# Patient Record
Sex: FEMALE | Race: WHITE | Employment: PART TIME
[De-identification: names, ages, dates, MRNs, and addresses within clinical notes are randomized per-mention and may not be internally consistent; named-entity substitution may affect disease eponyms.]

---

## 2017-12-24 ENCOUNTER — HEALTH MAINTENANCE LETTER (OUTPATIENT)
Age: 32
End: 2017-12-24

## 2018-02-23 ENCOUNTER — OFFICE VISIT (OUTPATIENT)
Dept: DERMATOLOGY | Facility: CLINIC | Age: 33
End: 2018-02-23
Payer: COMMERCIAL

## 2018-02-23 DIAGNOSIS — L65.9 ALOPECIA: Primary | ICD-10-CM

## 2018-02-23 DIAGNOSIS — L65.9 ALOPECIA: ICD-10-CM

## 2018-02-23 LAB
BASOPHILS # BLD AUTO: 0.1 10E9/L (ref 0–0.2)
BASOPHILS NFR BLD AUTO: 1.2 %
DEPRECATED CALCIDIOL+CALCIFEROL SERPL-MC: 14 UG/L (ref 20–75)
DIFFERENTIAL METHOD BLD: NORMAL
EOSINOPHIL # BLD AUTO: 0.6 10E9/L (ref 0–0.7)
EOSINOPHIL NFR BLD AUTO: 8 %
ERYTHROCYTE [DISTWIDTH] IN BLOOD BY AUTOMATED COUNT: 12.1 % (ref 10–15)
HCT VFR BLD AUTO: 36.4 % (ref 35–47)
HGB BLD-MCNC: 11.9 G/DL (ref 11.7–15.7)
IMM GRANULOCYTES # BLD: 0 10E9/L (ref 0–0.4)
IMM GRANULOCYTES NFR BLD: 0.4 %
LYMPHOCYTES # BLD AUTO: 2 10E9/L (ref 0.8–5.3)
LYMPHOCYTES NFR BLD AUTO: 25.9 %
MCH RBC QN AUTO: 29.6 PG (ref 26.5–33)
MCHC RBC AUTO-ENTMCNC: 32.7 G/DL (ref 31.5–36.5)
MCV RBC AUTO: 91 FL (ref 78–100)
MONOCYTES # BLD AUTO: 0.5 10E9/L (ref 0–1.3)
MONOCYTES NFR BLD AUTO: 6.5 %
NEUTROPHILS # BLD AUTO: 4.5 10E9/L (ref 1.6–8.3)
NEUTROPHILS NFR BLD AUTO: 58 %
NRBC # BLD AUTO: 0 10*3/UL
NRBC BLD AUTO-RTO: 0 /100
PLATELET # BLD AUTO: 382 10E9/L (ref 150–450)
RBC # BLD AUTO: 4.02 10E12/L (ref 3.8–5.2)
T4 FREE SERPL-MCNC: 1.02 NG/DL (ref 0.76–1.46)
TSH SERPL DL<=0.005 MIU/L-ACNC: 4.99 MU/L (ref 0.4–4)
WBC # BLD AUTO: 7.8 10E9/L (ref 4–11)

## 2018-02-23 RX ORDER — CETIRIZINE HYDROCHLORIDE 10 MG/1
10 TABLET ORAL DAILY
COMMUNITY

## 2018-02-23 ASSESSMENT — PAIN SCALES - GENERAL: PAINLEVEL: NO PAIN (0)

## 2018-02-23 NOTE — LETTER
2/23/2018       RE: Barbie Varela  [address]  [place] [address]     Dear Colleague,    Thank you for referring your patient, Barbie Varela, to the [facility] DERMATOLOGY at [facility]. Please see a copy of my visit note below.         [facility] Dermatology Note      Dermatology Problem List:  1.  Cystic Acne, treated with Spironolactone  2.  Alopecia, labs obtained 2/23/18    Encounter Date: Feb 23, 2018    CC:  Chief Complaint   Patient presents with     Hair Loss     Barbie is here for hairloss. She has 2 spots of concern.      History of Present Illness:  Ms. Barbie Varela is a 32 year old female who presents for evaluation of hairloss.  She has been experiencing hair loss consistently for the past 2 years, with a recent exacerbation of hair loss over the past 6 months.  She has clumps of hair falling out in the shower and needing to unclog her drain frequently.  She notes that she is cold more than usual when others are warm, dry skin, fatigue, brittle dry hair, as well as weight gain.  Last had her thyroid checked in 2016, which was normal at that time.  Barbie notes that she washes her hair every 2-3 days, recently switched to Redkin Cerafill shampoo.  She has tried diluted peppermint oil and Rogaine for a few months without improvement.  She has had a history of iron-deficiency anemia  2/2 vegetarian diet for which she was on iron supplementation discontinued over the summer.  Her hair loss has been dramatically worsening in the last few months.  She also feels as though she has been stressed out recently for various reasons, including her parents' health conditions.  Has a history of irregular periods; was put on Mirena in 5/2015 and didn't have periods or spotting for about 2 years.  Recently, over the last 6 months has been spotting daily.  She also stopped taking spironolactone early fall 2017, with a notable increase in hair  loss following.  Barbie estimates that 1/4 of her hair density has been lost with hair falling out in clumps.  Associated symptoms include scalp itching and burning.    Past Medical History:   Patient Active Problem List   Diagnosis     Routine general medical examination at a health care facility     Acne     Itching     Papanicolaou smear of cervix with low grade squamous intraepithelial lesion (LGSIL)     Encounter for insertion of mirena IUD     Acne, unspecified acne type     Past Medical History:   Diagnosis Date     Acne 10/28/2014     ASCUS with positive high risk HPV 4/2015     Itching 10/28/2014     LSIL (low grade squamous intraepithelial lesion) on Pap smear 10/2014    + HPV colp - atypia     Past Surgical History:   Procedure Laterality Date     HC TOOTH EXTRACTION W/FORCEP       Social History:  The patient works as a psychiatric nurse. She has a cat with a history of exposure to ringworm 5-6 years ago.    Family History:  There is a family history of hair loss in the patient's father (male-patterened baldness) and mother.     Medications:  Current Outpatient Prescriptions   Medication Sig Dispense Refill     Montelukast Sodium (SINGULAIR PO) Take 10 mg by mouth       cetirizine (ZYRTEC) 10 MG tablet Take 10 mg by mouth daily       RaNITidine HCl (ZANTAC PO) Take 150 mg by mouth       triamcinolone (KENALOG) 0.1 % cream Apply topically 2 times daily -apply on the areas with dermatitis twice a day 1 g 10     spironolactone (ALDACTONE) 100 MG tablet Take 1 tablet (100 mg) by mouth daily (Patient not taking: Reported on 2/23/2018) 30 tablet 0     DoxePIN (SINEQUAN) 50 MG capsule Take 1 capsule (50 mg) by mouth At Bedtime (Patient not taking: Reported on 2/23/2018) 30 capsule 2     levonorgestrel (MIRENA) 20 MCG/24HR IUD 1 each (20 mcg) by Intrauterine route once for 1 dose 1 each 0     Fexofenadine HCl (ALLEGRA PO)        DoxePIN (SINEQUAN) 10 MG capsule Take 1 capsule (10 mg) by mouth At Bedtime (Patient  not taking: Reported on 2/23/2018) 90 capsule 1     Allergies   Allergen Reactions     Dust Mites      Cats      Review of Systems:  -Skin/Heme New Pt: The patient denies frequent sun exposure. The patient denies excessive scarring or problems healing except as per HPI. The patient denies excessive bleeding.,   -Constitutional: The patient denies fevers, chills, unintended weight loss, and night sweats.  Positive for fatigue and weight gain.  -HEENT: Patient denies nonhealing oral sores.  -MSK: Patient denies joint or muscle pain.  -Skin: As above in HPI. No additional skin concerns.  -  Physical exam:  Vitals: There were no vitals taken for this visit.  GEN: This is a well developed, well-nourished female in no acute distress, in a pleasant mood.    HEENT: On thyroid exam, full thyroid without nodules  SKIN: Focused examination of the scalp, face, neck, and forearms was performed. Skin of scalp appears normal with no translucency or scarring.  -Diffuse thinning of the hair throughout, especially in the frontal and crown regions, as well as the bilateral temporal  -Negative hair pull test  -Thinning of the lateral eyebrows, as well as diffuse thinning throughout with approximately 1/2 of total hair density lost  -No other lesions of concern on areas examined.   -no palpable thyromegaly or mass    Impression/Plan:  1. Alopecia    We discussed the multiple possible etiologies, natural history and treatment options for alopecia.  Hormonal birth control, Mirena, placement coincides with hair loss timeline.  No current evidence of scarring hair loss.  DDx includes telogenium effluvium vs. female pattern baldness.    For alopecia, will obtain TSH with reflex T4, CBC with Diff, Zinc, and Vitamin D.  Laboratory results will be relayed to the patient by Dr. Francis Navarrete as soon as they become available.    Follow-up in 3 months, earlier for new or changing lesions.     Staff Involved:  Scribed by Verna Dorantes MS4 for   Kevin.      I saw and evaluated this patient with MS Jose E. The above note has been read and reviewed and where appropriate amended and corrected. It accurately reflects my clinical findings, observations, diagnoses, treatment recommendations and follow-up plans. We discussed hair counts. If the labs are normal and she remains concerned, particularly if hair counts indicate she's losing more than 100 hairs daily, the next step would be scalp biopsy.     Rachael Brown MD  Clinical Professor   Department of Dermatology  [facility]

## 2018-02-23 NOTE — NURSING NOTE
Dermatology Rooming Note    Barbie Varela's goals for this visit include:   Chief Complaint   Patient presents with     Hair Loss     Barbie is here for hairloss. She has 2 spots of concern.

## 2018-02-23 NOTE — MR AVS SNAPSHOT
After Visit Summary   2/23/2018    Barbie Varela    MRN: [id number]           Patient Information     Date Of Birth          1985        Visit Information        Provider Department      2/23/2018 10:00 AM Rachael Brown MD [facility] Dermatology        Today's Diagnoses     Alopecia    -  1       Follow-ups after your visit        Follow-up notes from your care team     Return in about 3 months (around 5/23/2018).      Who to contact     Please call your clinic at [phone] to:    Ask questions about your health    Make or cancel appointments    Discuss your medicines    Learn about your test results    Speak to your doctor            Additional Information About Your Visit        MyChart Information     [employer] gives you secure access to your electronic health record. If you see a primary care provider, you can also send messages to your care team and make appointments. If you have questions, please call your primary care clinic.  If you do not have a primary care provider, please call [phone] and they will assist you.      [employer] is an electronic gateway that provides easy, online access to your medical records. With [employer], you can request a clinic appointment, read your test results, renew a prescription or communicate with your care team.     To access your existing account, please contact your [facility] or call [phone] for assistance.        Care EveryWhere ID     This is your Care EveryWhere ID. This could be used by other organizations to access your [facility] medical records  [id number]         Blood Pressure from Last 3 Encounters:   09/18/15 118/68   06/29/15 124/74   06/27/15 132/85    Weight from Last 3 Encounters:   09/18/15 60.2 kg (132 lb 12.8 oz)   06/29/15 60.3 kg (133 lb)   06/27/15 61.2 kg (135 lb)               Primary Care Provider Office Phone # Fax #    Cookie Bruno [phone]  [facility] [address]        Equal Access to Services     [facility] LENCHO : Hadii emilie lang mihaelaamita Soaubrey, waaxda luqadaha, qaybta kaalmaflorentino joel, taylor rebolledosaderichie aguilar. So [facility] [phone].    ATENCIÓN: Si habla español, tiene a wilson disposición servicios gratuitos de asistencia lingüística. Davidame al [phone].    We comply with applicable federal civil rights laws and [place] laws. We do not discriminate on the basis of race, color, national origin, age, disability, sex, sexual orientation, or gender identity.            Thank you!     Thank you for choosing [facility] DERMATOLOGY  for your care. Our goal is always to provide you with excellent care. Hearing back from our patients is one way we can continue to improve our services. Please take a few minutes to complete the written survey that you may receive in the mail after your visit with us. Thank you!             Your Updated Medication List - Protect others around you: Learn how to safely use, store and throw away your medicines at www.disposemymeds.org.          This list is accurate as of 2/23/18 11:59 PM.  Always use your most recent med list.                   Brand Name Dispense Instructions for use Diagnosis    ALLEGRA PO           cetirizine 10 MG tablet    zyrTEC     Take 10 mg by mouth daily        * doxepin 10 MG capsule    SINEquan    90 capsule    Take 1 capsule (10 mg) by mouth At Bedtime    Itching       * doxepin 50 MG capsule    SINEquan    30 capsule    Take 1 capsule (50 mg) by mouth At Bedtime    Itching       levonorgestrel 20 MCG/24HR IUD    MIRENA    1 each    1 each (20 mcg) by Intrauterine route once for 1 dose    Encounter for insertion of mirena IUD       SINGULAIR PO      Take 10 mg by mouth        spironolactone 100 MG tablet    ALDACTONE    30 tablet    Take 1 tablet (100 mg) by mouth daily    Acne, unspecified acne type       triamcinolone 0.1 % cream    KENALOG    1 g    Apply topically 2 times daily  -apply on the areas with dermatitis twice a day    Itching       ZANTAC PO      Take 150 mg by mouth        * Notice:  This list has 2 medication(s) that are the same as other medications prescribed for you. Read the directions carefully, and ask your doctor or other care provider to review them with you.

## 2018-02-25 LAB — ZINC SERPL-MCNC: 80 UG/DL (ref 60–120)

## 2018-03-01 PROBLEM — L64.9 ANDROGENETIC ALOPECIA: Status: ACTIVE | Noted: 2018-03-01

## 2018-06-26 ENCOUNTER — HEALTH MAINTENANCE LETTER (OUTPATIENT)
Age: 33
End: 2018-06-26

## 2018-08-19 ENCOUNTER — HOSPITAL ENCOUNTER (EMERGENCY)
Facility: CLINIC | Age: 33
Discharge: HOME | End: 2018-08-19
Attending: EMERGENCY MEDICINE | Admitting: EMERGENCY MEDICINE
Payer: COMMERCIAL

## 2018-08-19 VITALS
OXYGEN SATURATION: 97 % | TEMPERATURE: 97.6 F | HEIGHT: 67 IN | SYSTOLIC BLOOD PRESSURE: 127 MMHG | DIASTOLIC BLOOD PRESSURE: 94 MMHG | BODY MASS INDEX: 21.19 KG/M2 | HEART RATE: 100 BPM | RESPIRATION RATE: 18 BRPM | WEIGHT: 135 LBS

## 2018-08-19 DIAGNOSIS — T74.91XA DOMESTIC VIOLENCE OF ADULT, INITIAL ENCOUNTER: ICD-10-CM

## 2018-08-19 DIAGNOSIS — S01.511A LIP LACERATION, INITIAL ENCOUNTER: ICD-10-CM

## 2018-08-19 PROCEDURE — 25000128 H RX IP 250 OP 636: Performed by: EMERGENCY MEDICINE

## 2018-08-19 PROCEDURE — 90714 TD VACC NO PRESV 7 YRS+ IM: CPT | Performed by: EMERGENCY MEDICINE

## 2018-08-19 PROCEDURE — 12011 RPR F/E/E/N/L/M 2.5 CM/<: CPT | Performed by: EMERGENCY MEDICINE

## 2018-08-19 PROCEDURE — 99285 EMERGENCY DEPT VISIT HI MDM: CPT | Mod: 25 | Performed by: EMERGENCY MEDICINE

## 2018-08-19 PROCEDURE — 99282 EMERGENCY DEPT VISIT SF MDM: CPT | Mod: 25 | Performed by: EMERGENCY MEDICINE

## 2018-08-19 PROCEDURE — 12011 RPR F/E/E/N/L/M 2.5 CM/<: CPT | Mod: Z6 | Performed by: EMERGENCY MEDICINE

## 2018-08-19 PROCEDURE — 90471 IMMUNIZATION ADMIN: CPT | Performed by: EMERGENCY MEDICINE

## 2018-08-19 PROCEDURE — 25000132 ZZH RX MED GY IP 250 OP 250 PS 637: Performed by: EMERGENCY MEDICINE

## 2018-08-19 RX ORDER — LIDOCAINE HYDROCHLORIDE AND EPINEPHRINE 10; 10 MG/ML; UG/ML
1 INJECTION, SOLUTION INFILTRATION; PERINEURAL ONCE
Status: DISCONTINUED | OUTPATIENT
Start: 2018-08-19 | End: 2018-08-19 | Stop reason: HOSPADM

## 2018-08-19 RX ORDER — ACETAMINOPHEN 325 MG/1
975 TABLET ORAL ONCE
Status: COMPLETED | OUTPATIENT
Start: 2018-08-19 | End: 2018-08-19

## 2018-08-19 RX ADMIN — ACETAMINOPHEN 975 MG: 325 TABLET, FILM COATED ORAL at 06:28

## 2018-08-19 RX ADMIN — CLOSTRIDIUM TETANI TOXOID ANTIGEN (FORMALDEHYDE INACTIVATED) AND CORYNEBACTERIUM DIPHTHERIAE TOXOID ANTIGEN (FORMALDEHYDE INACTIVATED) 0.5 ML: 5; 2 INJECTION, SUSPENSION INTRAMUSCULAR at 06:28
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
## 2018-08-19 NOTE — ED NOTES
Patient's mother will be coming to pick patient up and patient will be staying with her mother. Patient sleeping at this time waiting for her mother to arrive.
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
## 2018-08-19 NOTE — ED AVS SNAPSHOT
[facility], [place], Emergency Department    [address] [place] [address]    Phone:  [phone]                                       Barbie Varela   MRN: [id number]    Department:  [facility], Emergency Department   Date of Visit:  8/19/2018           After Visit Summary Signature Page     I have received my discharge instructions, and my questions have been answered. I have discussed any challenges I see with this plan with the nurse or doctor.    ..........................................................................................................................................  Patient/Patient Representative Signature      ..........................................................................................................................................  Patient Representative Print Name and Relationship to Patient    ..................................................               ................................................  Date                                            Time    ..........................................................................................................................................  Reviewed by Signature/Title    ...................................................              ..............................................  Date                                                            Time

## 2018-10-23 ENCOUNTER — SURGERY (OUTPATIENT)
Age: 33
End: 2018-10-23

## 2018-10-23 ENCOUNTER — ANESTHESIA EVENT (OUTPATIENT)
Dept: GASTROENTEROLOGY | Facility: CLINIC | Age: 33
End: 2018-10-23
Payer: COMMERCIAL

## 2018-10-23 ENCOUNTER — HOSPITAL ENCOUNTER (OUTPATIENT)
Facility: CLINIC | Age: 33
Discharge: HOME | End: 2018-10-23
Attending: INTERNAL MEDICINE | Admitting: INTERNAL MEDICINE
Payer: COMMERCIAL

## 2018-10-23 ENCOUNTER — ANESTHESIA (OUTPATIENT)
Dept: GASTROENTEROLOGY | Facility: CLINIC | Age: 33
End: 2018-10-23
Payer: COMMERCIAL

## 2018-10-23 VITALS
DIASTOLIC BLOOD PRESSURE: 78 MMHG | SYSTOLIC BLOOD PRESSURE: 118 MMHG | BODY MASS INDEX: 21.19 KG/M2 | WEIGHT: 135 LBS | OXYGEN SATURATION: 99 % | HEIGHT: 67 IN | RESPIRATION RATE: 21 BRPM

## 2018-10-23 LAB
LIPASE SERPL-CCNC: 332 U/L (ref 73–393)
UPPER EUS: NORMAL

## 2018-10-23 PROCEDURE — 83690 ASSAY OF LIPASE: CPT | Performed by: INTERNAL MEDICINE

## 2018-10-23 PROCEDURE — 37000008 ZZH ANESTHESIA TECHNICAL FEE, 1ST 30 MIN: Performed by: INTERNAL MEDICINE

## 2018-10-23 PROCEDURE — 25000125 ZZHC RX 250: Performed by: NURSE ANESTHETIST, CERTIFIED REGISTERED

## 2018-10-23 PROCEDURE — 25000128 H RX IP 250 OP 636: Performed by: NURSE ANESTHETIST, CERTIFIED REGISTERED

## 2018-10-23 PROCEDURE — 36415 COLL VENOUS BLD VENIPUNCTURE: CPT | Performed by: INTERNAL MEDICINE

## 2018-10-23 PROCEDURE — 88305 TISSUE EXAM BY PATHOLOGIST: CPT | Mod: 26 | Performed by: INTERNAL MEDICINE

## 2018-10-23 PROCEDURE — 43239 EGD BIOPSY SINGLE/MULTIPLE: CPT | Mod: XS | Performed by: INTERNAL MEDICINE

## 2018-10-23 PROCEDURE — 88305 TISSUE EXAM BY PATHOLOGIST: CPT | Performed by: INTERNAL MEDICINE

## 2018-10-23 PROCEDURE — 40000010 ZZH STATISTIC ANES STAT CODE-CRNA PER MINUTE: Performed by: INTERNAL MEDICINE

## 2018-10-23 PROCEDURE — 43259 EGD US EXAM DUODENUM/JEJUNUM: CPT | Performed by: INTERNAL MEDICINE

## 2018-10-23 RX ORDER — NALOXONE HYDROCHLORIDE 0.4 MG/ML
.1-.4 INJECTION, SOLUTION INTRAMUSCULAR; INTRAVENOUS; SUBCUTANEOUS
Status: DISCONTINUED | OUTPATIENT
Start: 2018-10-23 | End: 2018-10-23 | Stop reason: HOSPADM

## 2018-10-23 RX ORDER — ONDANSETRON 2 MG/ML
4 INJECTION INTRAMUSCULAR; INTRAVENOUS EVERY 30 MIN PRN
Status: DISCONTINUED | OUTPATIENT
Start: 2018-10-23 | End: 2018-10-23 | Stop reason: HOSPADM

## 2018-10-23 RX ORDER — SACCHAROMYCES BOULARDII 250 MG
250 CAPSULE ORAL 2 TIMES DAILY
COMMUNITY

## 2018-10-23 RX ORDER — ONDANSETRON 2 MG/ML
INJECTION INTRAMUSCULAR; INTRAVENOUS PRN
Status: DISCONTINUED | OUTPATIENT
Start: 2018-10-23 | End: 2018-10-23

## 2018-10-23 RX ORDER — ACETAMINOPHEN 650 MG/1
650 SUPPOSITORY RECTAL EVERY 4 HOURS PRN
Status: DISCONTINUED | OUTPATIENT
Start: 2018-10-23 | End: 2018-10-23 | Stop reason: HOSPADM

## 2018-10-23 RX ORDER — KETOROLAC TROMETHAMINE 30 MG/ML
30 INJECTION, SOLUTION INTRAMUSCULAR; INTRAVENOUS EVERY 6 HOURS PRN
Status: DISCONTINUED | OUTPATIENT
Start: 2018-10-23 | End: 2018-10-23 | Stop reason: HOSPADM

## 2018-10-23 RX ORDER — NALOXONE HYDROCHLORIDE 0.4 MG/ML
.1-.4 INJECTION, SOLUTION INTRAMUSCULAR; INTRAVENOUS; SUBCUTANEOUS
Status: DISCONTINUED | OUTPATIENT
Start: 2018-10-23 | End: 2018-10-23

## 2018-10-23 RX ORDER — FENTANYL CITRATE 50 UG/ML
25-100 INJECTION, SOLUTION INTRAMUSCULAR; INTRAVENOUS
Status: DISCONTINUED | OUTPATIENT
Start: 2018-10-23 | End: 2018-10-23 | Stop reason: HOSPADM

## 2018-10-23 RX ORDER — LIDOCAINE 40 MG/G
CREAM TOPICAL
Status: DISCONTINUED | OUTPATIENT
Start: 2018-10-23 | End: 2018-10-23 | Stop reason: HOSPADM

## 2018-10-23 RX ORDER — FENTANYL CITRATE 50 UG/ML
25-50 INJECTION, SOLUTION INTRAMUSCULAR; INTRAVENOUS
Status: DISCONTINUED | OUTPATIENT
Start: 2018-10-23 | End: 2018-10-23 | Stop reason: HOSPADM

## 2018-10-23 RX ORDER — HYDROMORPHONE HYDROCHLORIDE 1 MG/ML
.3-.5 INJECTION, SOLUTION INTRAMUSCULAR; INTRAVENOUS; SUBCUTANEOUS EVERY 10 MIN PRN
Status: DISCONTINUED | OUTPATIENT
Start: 2018-10-23 | End: 2018-10-23 | Stop reason: HOSPADM

## 2018-10-23 RX ORDER — MEPERIDINE HYDROCHLORIDE 25 MG/ML
12.5 INJECTION INTRAMUSCULAR; INTRAVENOUS; SUBCUTANEOUS
Status: DISCONTINUED | OUTPATIENT
Start: 2018-10-23 | End: 2018-10-23 | Stop reason: HOSPADM

## 2018-10-23 RX ORDER — ONDANSETRON 4 MG/1
4 TABLET, ORALLY DISINTEGRATING ORAL EVERY 30 MIN PRN
Status: DISCONTINUED | OUTPATIENT
Start: 2018-10-23 | End: 2018-10-23 | Stop reason: HOSPADM

## 2018-10-23 RX ORDER — PROPOFOL 10 MG/ML
INJECTION, EMULSION INTRAVENOUS CONTINUOUS PRN
Status: DISCONTINUED | OUTPATIENT
Start: 2018-10-23 | End: 2018-10-23

## 2018-10-23 RX ORDER — DIAZEPAM 10 MG/2ML
2.5 INJECTION, SOLUTION INTRAMUSCULAR; INTRAVENOUS
Status: DISCONTINUED | OUTPATIENT
Start: 2018-10-23 | End: 2018-10-23 | Stop reason: HOSPADM

## 2018-10-23 RX ORDER — SODIUM CHLORIDE, SODIUM LACTATE, POTASSIUM CHLORIDE, CALCIUM CHLORIDE 600; 310; 30; 20 MG/100ML; MG/100ML; MG/100ML; MG/100ML
INJECTION, SOLUTION INTRAVENOUS CONTINUOUS PRN
Status: DISCONTINUED | OUTPATIENT
Start: 2018-10-23 | End: 2018-10-23

## 2018-10-23 RX ORDER — FLUMAZENIL 0.1 MG/ML
0.2 INJECTION, SOLUTION INTRAVENOUS
Status: DISCONTINUED | OUTPATIENT
Start: 2018-10-23 | End: 2018-10-23 | Stop reason: HOSPADM

## 2018-10-23 RX ORDER — SODIUM CHLORIDE, SODIUM LACTATE, POTASSIUM CHLORIDE, CALCIUM CHLORIDE 600; 310; 30; 20 MG/100ML; MG/100ML; MG/100ML; MG/100ML
INJECTION, SOLUTION INTRAVENOUS CONTINUOUS
Status: DISCONTINUED | OUTPATIENT
Start: 2018-10-23 | End: 2018-10-23 | Stop reason: HOSPADM

## 2018-10-23 RX ADMIN — SODIUM CHLORIDE, POTASSIUM CHLORIDE, SODIUM LACTATE AND CALCIUM CHLORIDE: 600; 310; 30; 20 INJECTION, SOLUTION INTRAVENOUS at 13:00

## 2018-10-23 RX ADMIN — PROPOFOL 100 MCG/KG/MIN: 10 INJECTION, EMULSION INTRAVENOUS at 13:06

## 2018-10-23 RX ADMIN — ONDANSETRON 4 MG: 2 INJECTION INTRAMUSCULAR; INTRAVENOUS at 13:00

## 2018-10-23 RX ADMIN — DEXMEDETOMIDINE HYDROCHLORIDE 12 MCG: 100 INJECTION, SOLUTION INTRAVENOUS at 13:18

## 2018-10-23 NOTE — ANESTHESIA POSTPROCEDURE EVALUATION
Patient: Barbie Varela    Procedure(s):  ENDOSCOPIC ULTRASOUND WITH FINE NEEDLE ASPIRATION  COMBINED ESOPHAGOSCOPY, GASTROSCOPY, DUODENOSCOPY (EGD), BIOPSY SINGLE OR MULTIPLE    Diagnosis:ELEVATED LYPASE  Diagnosis Additional Information: No value filed.    Anesthesia Type:  MAC    Note:  Anesthesia Post Evaluation    Patient location during evaluation: PACU  Patient participation: Able to fully participate in evaluation  Level of consciousness: awake  Pain management: adequate  Airway patency: patent  Cardiovascular status: acceptable  Respiratory status: acceptable  Hydration status: acceptable  PONV: controlled     Anesthetic complications: None          Last vitals:  Vitals:    10/23/18 1410 10/23/18 1411 10/23/18 1420   BP: 110/74  118/78   Resp:  15 21   SpO2:  98% 99%         Electronically Signed By: Niranjan Treviño MD  October 23, 2018  3:50 PM

## 2018-10-23 NOTE — ANESTHESIA PREPROCEDURE EVALUATION
Anesthesia Evaluation     . Pt has had prior anesthetic.     No history of anesthetic complications          ROS/MED HX    ENT/Pulmonary:      (-) sleep apnea   Neurologic:       Cardiovascular:         METS/Exercise Tolerance:     Hematologic:         Musculoskeletal:         GI/Hepatic:     (+) Other GI/Hepatic elevated lipase - reason for procedure     (-) GERD   Renal/Genitourinary:         Endo:     (+) thyroid problem hypothyroidism, .      Psychiatric:         Infectious Disease:         Malignancy:         Other:                     Physical Exam  Normal systems: cardiovascular, pulmonary and dental    Airway   Mallampati: I  TM distance: >3 FB  Neck ROM: full    Dental     Cardiovascular       Pulmonary                     Anesthesia Plan      History & Physical Review  History and physical reviewed and following examination; no interval change.    ASA Status:  2 .    NPO Status:  > 8 hours    Plan for MAC Reason for MAC:  Procedure to face, neck, head or breast  PONV prophylaxis:  Ondansetron (or other 5HT-3)  No fentanyl.  Propofol sedation.      Postoperative Care  Postoperative pain management:  IV analgesics.      Consents  Anesthetic plan, risks, benefits and alternatives discussed with:  Patient..                          .

## 2018-10-23 NOTE — ANESTHESIA CARE TRANSFER NOTE
Patient: Barbie Varela    Procedure(s):  ENDOSCOPIC ULTRASOUND WITH FINE NEEDLE ASPIRATION  COMBINED ESOPHAGOSCOPY, GASTROSCOPY, DUODENOSCOPY (EGD), BIOPSY SINGLE OR MULTIPLE    Diagnosis: ELEVATED LYPASE  Diagnosis Additional Information: No value filed.    Anesthesia Type:   MAC     Note:  Airway :Room Air  Patient transferred to:Phase II  Handoff Report: Identifed the Patient, Identified the Reponsible Provider, Reviewed the pertinent medical history, Discussed the surgical course, Reviewed Intra-OP anesthesia mangement and issues during anesthesia, Set expectations for post-procedure period and Allowed opportunity for questions and acknowledgement of understanding      Vitals: (Last set prior to Anesthesia Care Transfer)    CRNA VITALS  10/23/2018 1255 - 10/23/2018 1325      10/23/2018             NIBP: 102/64    Pulse: 74    NIBP Mean: 75    Ht Rate: 80    SpO2: 99 %    Resp Rate (set): 10                Electronically Signed By: AZUCENA Pavon CRNA  October 23, 2018  1:25 PM

## 2018-10-24 LAB — COPATH REPORT: NORMAL

## 2020-03-10 ENCOUNTER — HEALTH MAINTENANCE LETTER (OUTPATIENT)
Age: 35
End: 2020-03-10

## 2020-06-25 NOTE — PROGRESS NOTES
[facility] Dermatology Note      Dermatology Problem List:  1.  Cystic Acne, treated with Spironolactone  2.  Alopecia, labs obtained 2/23/18    Encounter Date: Feb 23, 2018    CC:  Chief Complaint   Patient presents with     Hair Loss     Barbie is here for hairloss. She has 2 spots of concern.      History of Present Illness:  Ms. Barbie Varela is a 32 year old female who presents for evaluation of hairloss.  She has been experiencing hair loss consistently for the past 2 years, with a recent exacerbation of hair loss over the past 6 months.  She has clumps of hair falling out in the shower and needing to unclog her drain frequently.  She notes that she is cold more than usual when others are warm, dry skin, fatigue, brittle dry hair, as well as weight gain.  Last had her thyroid checked in 2016, which was normal at that time.  Barbie notes that she washes her hair every 2-3 days, recently switched to Redkin Cerafill shampoo.  She has tried diluted peppermint oil and Rogaine for a few months without improvement.  She has had a history of iron-deficiency anemia  2/2 vegetarian diet for which she was on iron supplementation discontinued over the summer.  Her hair loss has been dramatically worsening in the last few months.  She also feels as though she has been stressed out recently for various reasons, including her parents' health conditions.  Has a history of irregular periods; was put on Mirena in 5/2015 and didn't have periods or spotting for about 2 years.  Recently, over the last 6 months has been spotting daily.  She also stopped taking spironolactone early fall 2017, with a notable increase in hair loss following.  Barbie estimates that 1/4 of her hair density has been lost with hair falling out in clumps.  Associated symptoms include scalp itching and burning.    Past Medical History:   Patient Active Problem List   Diagnosis     Routine general medical examination at a health care  facility     Acne     Itching     Papanicolaou smear of cervix with low grade squamous intraepithelial lesion (LGSIL)     Encounter for insertion of mirena IUD     Acne, unspecified acne type     Past Medical History:   Diagnosis Date     Acne 10/28/2014     ASCUS with positive high risk HPV 4/2015     Itching 10/28/2014     LSIL (low grade squamous intraepithelial lesion) on Pap smear 10/2014    + HPV colp - atypia     Past Surgical History:   Procedure Laterality Date     HC TOOTH EXTRACTION W/FORCEP       Social History:  The patient works as a psychiatric nurse. She has a cat with a history of exposure to ringworm 5-6 years ago.    Family History:  There is a family history of hair loss in the patient's father (male-patterened baldness) and mother.     Medications:  Current Outpatient Prescriptions   Medication Sig Dispense Refill     Montelukast Sodium (SINGULAIR PO) Take 10 mg by mouth       cetirizine (ZYRTEC) 10 MG tablet Take 10 mg by mouth daily       RaNITidine HCl (ZANTAC PO) Take 150 mg by mouth       triamcinolone (KENALOG) 0.1 % cream Apply topically 2 times daily -apply on the areas with dermatitis twice a day 1 g 10     spironolactone (ALDACTONE) 100 MG tablet Take 1 tablet (100 mg) by mouth daily (Patient not taking: Reported on 2/23/2018) 30 tablet 0     DoxePIN (SINEQUAN) 50 MG capsule Take 1 capsule (50 mg) by mouth At Bedtime (Patient not taking: Reported on 2/23/2018) 30 capsule 2     levonorgestrel (MIRENA) 20 MCG/24HR IUD 1 each (20 mcg) by Intrauterine route once for 1 dose 1 each 0     Fexofenadine HCl (ALLEGRA PO)        DoxePIN (SINEQUAN) 10 MG capsule Take 1 capsule (10 mg) by mouth At Bedtime (Patient not taking: Reported on 2/23/2018) 90 capsule 1     Allergies   Allergen Reactions     Dust Mites      Cats      Review of Systems:  -Skin/Heme New Pt: The patient denies frequent sun exposure. The patient denies excessive scarring or problems healing except as per HPI. The patient denies  excessive bleeding.,   -Constitutional: The patient denies fevers, chills, unintended weight loss, and night sweats.  Positive for fatigue and weight gain.  -HEENT: Patient denies nonhealing oral sores.  -MSK: Patient denies joint or muscle pain.  -Skin: As above in HPI. No additional skin concerns.  -  Physical exam:  Vitals: There were no vitals taken for this visit.  GEN: This is a well developed, well-nourished female in no acute distress, in a pleasant mood.    HEENT: On thyroid exam, full thyroid without nodules  SKIN: Focused examination of the scalp, face, neck, and forearms was performed. Skin of scalp appears normal with no translucency or scarring.  -Diffuse thinning of the hair throughout, especially in the frontal and crown regions, as well as the bilateral temporal  -Negative hair pull test  -Thinning of the lateral eyebrows, as well as diffuse thinning throughout with approximately 1/2 of total hair density lost  -No other lesions of concern on areas examined.   -no palpable thyromegaly or mass      Impression/Plan:  1. Alopecia    We discussed the multiple possible etiologies, natural history and treatment options for alopecia.  Hormonal birth control, Mirena, placement coincides with hair loss timeline.  No current evidence of scarring hair loss.  DDx includes telogenium effluvium vs. female pattern baldness.    For alopecia, will obtain TSH with reflex T4, CBC with Diff, Zinc, and Vitamin D.  Laboratory results will be relayed to the patient by Dr. Francis Navarrete as soon as they become available.    Follow-up in 3 months, earlier for new or changing lesions.     Staff Involved:  Scribed by Verna Dorantes MS4 for Dr. Brown.      I saw and evaluated this patient with MS Dorantes. The above note has been read and reviewed and where appropriate amended and corrected. It accurately reflects my clinical findings, observations, diagnoses, treatment recommendations and follow-up plans. We discussed hair  counts. If the labs are normal and she remains concerned, particularly if hair counts indicate she's losing more than 100 hairs daily, the next step would be scalp biopsy.     Rachael rBown MD  Clinical Professor   Department of Dermatology  [facility]     Note Text (......Xxx Chief Complaint.): This diagnosis correlates with the

## 2020-11-25 ENCOUNTER — E-VISIT (OUTPATIENT)
Dept: URGENT CARE | Facility: CLINIC | Age: 35
End: 2020-11-25
Payer: COMMERCIAL

## 2020-11-25 DIAGNOSIS — J02.9 SORE THROAT: ICD-10-CM

## 2020-11-25 DIAGNOSIS — Z20.822 SUSPECTED COVID-19 VIRUS INFECTION: ICD-10-CM

## 2020-11-25 PROCEDURE — 99421 OL DIG E/M SVC 5-10 MIN: CPT | Mod: TEL | Performed by: EMERGENCY MEDICINE

## 2020-11-26 NOTE — PATIENT INSTRUCTIONS
Barbie, I am concerned about the prolonged fatigue. This requires follow-up with your PCP because other causes need to be investigated. Please call for an appointment on Friday. We also do testing as noted below. TRIPP Butt MD      Dear Barbie Varela,        Your symptoms show that you may have coronavirus (COVID-19). This illness can cause fever, cough and trouble breathing. Many people get a mild case and get better on their own. Some people can get very sick.    Because you also reported sore throat I would like to also test you for Strep Throat to determine if we need to treat you for that as well.    What should I do?  We would like to test you for Covid-19 virus and Strep Throat. I have placed orders for these tests.     For all employees or close contacts (except [employer] and [employer] - see below), go to your [employer] home page and scroll down to the section that says  You have an appointment that needs to be scheduled  and click the large green button that says  Schedule Now  and follow the steps to find the next available opening. PLEASE Mention when asked what you are scheduling for that you need BOTH Covid and Strep tests.   If you are unable to complete these steps or if you cannot find any available times, please call [phone] to schedule employee testing.       [employer] employees or close contacts, please call [phone].   [place] ([employer]) employees or close contacts call [phone].      When it's time for your COVID and Strep test:  Stay at least 6 feet away from others. (If someone will drive you to your test, stay in the backseat, as far away from the  as you can.)  Cover your mouth and nose with a mask, tissue or washcloth.  Go straight to the testing site. Don't make any stops on the way there or back.    Starting now:     Do not go to work.   o If you receive a negative COVID-19 test result and were NOT exposed to someone with a known positive COVID-19 test, you can  "return to work once you're free of fever for 24 hours without fever-reducing medication and your symptoms are improving or resolved.  o If you receive a positive COVID-19 test result, you must be cleared by Employee Occupational Health and Safety to return to work.   o If you were exposed to someone who has tested positive for COVID-19, you can return to work 14 days after your last contact with the positive individual, provided you do not have symptoms at all during that time. In some cases, your manager may ask you to come back sooner than 14 days.     During this time, don't leave the house except for testing or medical care.  o Stay in your own room, even for meals. Use your own bathroom if you can.  o Stay away from others in your home. No hugging, kissing or shaking hands. No visitors.  o Don't go to work, school or anywhere else.    Clean \"high touch\" surfaces often (doorknobs, counters, handles, etc.). Use a household cleaning spray or wipes. You'll find a full list of  on the EPA website: www.epa.gov/pesticide-registration/list-n-disinfectants-use-against-sars-cov-2.    Cover your mouth and nose with a mask, tissue or washcloth to avoid spreading germs.    Wash your hands and face often. Use soap and water.    People in these groups are at risk for severe illness due to COVID-19:  o People 65 years and older  o People who live in a nursing home or long-term care facility  o People with chronic disease (lung, heart, cancer, diabetes, kidney, liver, immunologic)  o People who have a weakened immune system, including those who:  - Are in cancer treatment  - Take medicine that weakens the immune system, such as corticosteroids  - Had a bone marrow or organ transplant  - Have an immune deficiency  - Have poorly controlled HIV or AIDS  - Are obese (body mass index of 40 or higher)  - Smoke regularly      Caregivers should wear gloves while washing dishes, handling laundry and cleaning bedrooms and " bathrooms.    Use caution when washing and drying laundry: Don't shake dirty laundry, and use the warmest water setting that you can.    For more tips, go to www.cdc.gov/coronavirus/2019-ncov/downloads/10Things.pdf.    Sign up for [employer]. We know it's scary to hear that you might have COVID-19. We want to track your symptoms to make sure you're okay over the next 2 weeks. Please look for an email from [employer]--this is a free, online program that we'll use to keep in touch. To sign up, follow the link in the email you will receive. Learn more at [email or web address]    How can I take care of myself?    Get lots of rest. Drink extra fluids (unless a doctor has told you not to)    Take Tylenol (acetaminophen) for fever or pain. If you have liver or kidney problems, ask your family doctor if it's okay to take Tylenol.  Adults can take either:    650 mg (two 325 mg pills) every 4 to 6 hours, or     1,000 mg (two 500 mg pills) every 8 hours as needed.    Note: Don't take more than 3,000 mg in one day. Acetaminophen is found in many medicines (both prescribed and over-the-counter medicines). Read all labels to be sure you don't take too much.  For children, check the Tylenol bottle for the right dose. The dose is based on the child's age or weight.    If you have other health problems (like cancer, heart failure, an organ transplant or severe kidney disease): Call your specialty clinic if you don't feel better in the next 2 days.    Know when to call 911. Emergency warning signs include:  Trouble breathing or shortness of breath  Pain or pressure in the chest that doesn't go away  Feeling confused like you haven't felt before, or not being able to wake up  Bluish-colored lips or face  Where can I get more information?    [facility] - About COVID-19: [email or web address]    CDC - What to Do If You're Sick: www.cdc.gov/coronavirus/2019-ncov/about/steps-when-sick.html      November  25, 2020    RE:  Barbie Varela                                                                                                                                                       [address] [place] [address]        To whom it may concern:    I evaluated Barbie Varela on 11/25/20. Barbie Varela should be excused from work/school.     Do not go to work.      If you receive a negative COVID-19 test result and were NOT exposed to someone with a known positive COVID-19 test, you can return to work once you're free of fever for 24 hours without fever-reducing medication and your symptoms are improving or resolved.    If you receive a positive COVID-19 test result, you must be cleared by Employee Occupational Health and Safety to return to work.     If you were exposed to someone who has tested positive for COVID-19, you can return to work 14 days after your last contact with the positive individual, provided you do not have symptoms at all during that time. In some cases, your manager may ask you to come back sooner than 14 days.       Sincerely,  Ezio Butt MD

## 2020-11-27 DIAGNOSIS — Z20.822 SUSPECTED COVID-19 VIRUS INFECTION: ICD-10-CM

## 2020-11-27 LAB
DEPRECATED S PYO AG THROAT QL EIA: NEGATIVE
SPECIMEN SOURCE: NORMAL

## 2020-11-27 PROCEDURE — 87651 STREP A DNA AMP PROBE: CPT | Performed by: EMERGENCY MEDICINE

## 2020-11-27 PROCEDURE — 99N1174 PR STATISTIC STREP A RAPID: Performed by: EMERGENCY MEDICINE

## 2020-11-27 PROCEDURE — U0003 INFECTIOUS AGENT DETECTION BY NUCLEIC ACID (DNA OR RNA); SEVERE ACUTE RESPIRATORY SYNDROME CORONAVIRUS 2 (SARS-COV-2) (CORONAVIRUS DISEASE [COVID-19]), AMPLIFIED PROBE TECHNIQUE, MAKING USE OF HIGH THROUGHPUT TECHNOLOGIES AS DESCRIBED BY CMS-2020-01-R: HCPCS | Performed by: EMERGENCY MEDICINE

## 2020-11-28 LAB
SPECIMEN SOURCE: NORMAL
STREP GROUP A PCR: NOT DETECTED

## 2020-11-29 LAB
SARS-COV-2 RNA SPEC QL NAA+PROBE: NOT DETECTED
SPECIMEN SOURCE: NORMAL

## 2020-12-20 ENCOUNTER — HEALTH MAINTENANCE LETTER (OUTPATIENT)
Age: 35
End: 2020-12-20

## 2021-04-24 ENCOUNTER — HEALTH MAINTENANCE LETTER (OUTPATIENT)
Age: 36
End: 2021-04-24

## 2021-10-03 ENCOUNTER — HEALTH MAINTENANCE LETTER (OUTPATIENT)
Age: 36
End: 2021-10-03

## 2022-01-10 ENCOUNTER — LAB REQUISITION (OUTPATIENT)
Dept: LAB | Facility: CLINIC | Age: 37
End: 2022-01-10

## 2022-01-10 LAB — SARS-COV-2 RNA RESP QL NAA+PROBE: POSITIVE

## 2022-01-10 PROCEDURE — U0005 INFEC AGEN DETEC AMPLI PROBE: HCPCS | Performed by: INTERNAL MEDICINE

## 2022-05-15 ENCOUNTER — HEALTH MAINTENANCE LETTER (OUTPATIENT)
Age: 37
End: 2022-05-15

## 2022-09-10 ENCOUNTER — HEALTH MAINTENANCE LETTER (OUTPATIENT)
Age: 37
End: 2022-09-10

## 2023-06-03 ENCOUNTER — HEALTH MAINTENANCE LETTER (OUTPATIENT)
Age: 38
End: 2023-06-03

## 2024-02-18 ENCOUNTER — HEALTH MAINTENANCE LETTER (OUTPATIENT)
Age: 39
End: 2024-02-18

## 2025-03-10 ENCOUNTER — APPOINTMENT (OUTPATIENT)
Dept: URBAN - METROPOLITAN AREA CLINIC 259 | Age: 40
Setting detail: DERMATOLOGY
End: 2025-03-10

## 2025-03-10 DIAGNOSIS — L74.51 PRIMARY FOCAL HYPERHIDROSIS: ICD-10-CM

## 2025-03-10 PROBLEM — L74.519 PRIMARY FOCAL HYPERHIDROSIS, UNSPECIFIED: Status: ACTIVE | Noted: 2025-03-10

## 2025-03-10 PROBLEM — L74.510 PRIMARY FOCAL HYPERHIDROSIS, AXILLA: Status: ACTIVE | Noted: 2025-03-10

## 2025-03-10 PROCEDURE — OTHER PRESCRIPTION MEDICATION MANAGEMENT: OTHER

## 2025-03-10 PROCEDURE — OTHER MIPS QUALITY: OTHER

## 2025-03-10 PROCEDURE — OTHER PRESCRIPTION: OTHER

## 2025-03-10 PROCEDURE — OTHER COUNSELING: OTHER

## 2025-03-10 PROCEDURE — 99204 OFFICE O/P NEW MOD 45 MIN: CPT

## 2025-03-10 RX ORDER — GLYCOPYRROLATE 1 MG/1
TABLET ORAL
Qty: 180 | Refills: 3 | Status: ERX | COMMUNITY
Start: 2025-03-10

## 2025-03-10 ASSESSMENT — LOCATION DETAILED DESCRIPTION DERM
LOCATION DETAILED: LEFT ANTERIOR PROXIMAL THIGH
LOCATION DETAILED: RIGHT ANTERIOR PROXIMAL THIGH
LOCATION DETAILED: RIGHT AXILLARY VAULT
LOCATION DETAILED: LEFT AXILLARY VAULT

## 2025-03-10 ASSESSMENT — LOCATION ZONE DERM
LOCATION ZONE: LEG
LOCATION ZONE: AXILLAE

## 2025-03-10 ASSESSMENT — LOCATION SIMPLE DESCRIPTION DERM
LOCATION SIMPLE: LEFT AXILLARY VAULT
LOCATION SIMPLE: LEFT THIGH
LOCATION SIMPLE: RIGHT AXILLARY VAULT
LOCATION SIMPLE: RIGHT THIGH

## 2025-03-10 NOTE — HPI: SWEATING (HYPERHIDROSIS)
How Severe Is It?: moderate
Is This A New Presentation, Or A Follow-Up?: Sweating
Sweating Severity Scale: 2- The sweating is tolerable but sometimes interferes with daily activities
Additional History: The patient states that she is currently using Drysol to try and manage her hyperhidrosis. However, due to irritation of the skin, the patient is only able to apply topical solution once weekly. In addition to Drysol, the patient is applying clinical deodorant daily, which has minimally improved the sweating in her armpits and groin.

## 2025-03-10 NOTE — PROCEDURE: PRESCRIPTION MEDICATION MANAGEMENT
Render In Strict Bullet Format?: No
Plan: Discussed multiple different treatment options with patient, including MiraDry, Botox, Qbrexa and Glycopyrrolate. Patient was counseled on the cost and effectiveness of each treatment. She will RTC in one year for annual recheck. Otherwise, RTC in three months if no improvement.
Initiate Treatment: glycopyrrolate 1 mg tablet BID
Detail Level: Zone

## 2025-03-26 ENCOUNTER — APPOINTMENT (OUTPATIENT)
Dept: URBAN - METROPOLITAN AREA CLINIC 256 | Age: 40
Setting detail: DERMATOLOGY
End: 2025-03-26

## 2025-03-26 DIAGNOSIS — Z41.9 ENCOUNTER FOR PROCEDURE FOR PURPOSES OTHER THAN REMEDYING HEALTH STATE, UNSPECIFIED: ICD-10-CM

## 2025-03-26 PROCEDURE — OTHER MIRADRY: OTHER

## 2025-03-26 PROCEDURE — OTHER COSMETIC CONSULTATION: MIRADRY: OTHER

## 2025-03-26 NOTE — PROCEDURE: MIRADRY
Location 2: Left Axilla
Template Size: 60 x 100
Number Of Placements Location 3: 1
Number Of Placements Location 2: 15
Anesthesia Volume In Cc: 220
Detail Level: Detailed
Treatment Energy Level: 5 unlocked
Consent: Written consent obtained, risks reviewed including but not limited to crusting, scabbing, blistering, scarring, darker or lighter pigmentary change, incomplete improvement of hyperhidrosis, wrinkles.
Anesthesia Type: 1% lidocaine with epinephrine
Post-Care Instructions: I reviewed with the patient in detail post-care instructions. Patient should avoid sun until area fully healed.
Initial Location: Right Axilla

## 2025-07-10 ENCOUNTER — APPOINTMENT (OUTPATIENT)
Dept: URBAN - METROPOLITAN AREA CLINIC 259 | Age: 40
Setting detail: DERMATOLOGY
End: 2025-07-10

## 2025-07-10 DIAGNOSIS — L82.1 OTHER SEBORRHEIC KERATOSIS: ICD-10-CM

## 2025-07-10 DIAGNOSIS — Z71.89 OTHER SPECIFIED COUNSELING: ICD-10-CM

## 2025-07-10 DIAGNOSIS — L57.8 OTHER SKIN CHANGES DUE TO CHRONIC EXPOSURE TO NONIONIZING RADIATION: ICD-10-CM

## 2025-07-10 DIAGNOSIS — D18.0 HEMANGIOMA: ICD-10-CM

## 2025-07-10 DIAGNOSIS — L81.4 OTHER MELANIN HYPERPIGMENTATION: ICD-10-CM

## 2025-07-10 DIAGNOSIS — D22 MELANOCYTIC NEVI: ICD-10-CM

## 2025-07-10 PROBLEM — D18.01 HEMANGIOMA OF SKIN AND SUBCUTANEOUS TISSUE: Status: ACTIVE | Noted: 2025-07-10

## 2025-07-10 PROBLEM — D22.5 MELANOCYTIC NEVI OF TRUNK: Status: ACTIVE | Noted: 2025-07-10

## 2025-07-10 PROCEDURE — 99213 OFFICE O/P EST LOW 20 MIN: CPT

## 2025-07-10 PROCEDURE — OTHER COUNSELING: OTHER

## 2025-07-10 ASSESSMENT — LOCATION DETAILED DESCRIPTION DERM
LOCATION DETAILED: LEFT LATERAL UPPER BACK
LOCATION DETAILED: RIGHT SUPERIOR MEDIAL UPPER BACK
LOCATION DETAILED: RIGHT SUPERIOR UPPER BACK
LOCATION DETAILED: LEFT SUPERIOR UPPER BACK
LOCATION DETAILED: INFERIOR THORACIC SPINE
LOCATION DETAILED: LEFT MID-UPPER BACK

## 2025-07-10 ASSESSMENT — LOCATION SIMPLE DESCRIPTION DERM
LOCATION SIMPLE: RIGHT UPPER BACK
LOCATION SIMPLE: LEFT UPPER BACK
LOCATION SIMPLE: UPPER BACK

## 2025-07-10 ASSESSMENT — LOCATION ZONE DERM: LOCATION ZONE: TRUNK

## (undated) DEVICE — SOL WATER IRRIG 1000ML BOTTLE 2F7114